# Patient Record
(demographics unavailable — no encounter records)

---

## 2017-05-07 NOTE — EDPHY
H & P


Stated Complaint: took adderal and smoked marijuana and felt CP 4/10


Time Seen by Provider: 05/07/17 01:39


HPI/ROS: 





Chief Complaint:  Chest tightness, feels unwell





HPI:  19-year-old male with a history of attention deficit hyperactivity 

disorder who took 3 of his Vyvanse earlier this evening after smoking some 

marijuana.  Patient became increasingly anxious and started developing some 

substernal chest pressure to a 4/10.  He has a history of hypercholesterolemia 

so was concerned that this might represent a heart attack.  Pain is not 

exertional.  Began at rest.  On arrival EMS found him to be tachycardic.  Once 

again his heart rate down his chest tightness went away.  Denies any fevers or 

chills. No cough.  No nausea or vomiting. Patient states that he is anxious 

about his examinations and took an extra Vyvanse this morning to help him 

study.  Now is currently feeling improved on arrival to the emergency 

department.





ROS:  10 point Review of Systems is negative except as noted in the HPI.





PMH:  Attention deficit hyperactivity disorder


Medications:  Vyvanse





Social History: No smoking, no alcohol, occasional marijuana use





Family History: non-contributory





Physical Exam:


Gen: Awake, Alert, anxious appearing


HEENT:  


     Nose: no rhinorrhea


     Eyes: PERRLA, EOMI


     Mouth: Moist mucosa 


Neck: Supple, no JVD


Chest: nontender, lungs clear to auscultation


Heart: S1, S2 normal, no murmur


Abd: Soft, non-tender, no guarding


Back: no CVA tenderness, no midline tenderness 


Ext: no edema, non-tender


Skin: no rash


Neuro: CN II-XII intact, Sensation grossly intact, Strength 5/5 in bilateral 

upper and lower extremities








- Personal History


Current Tetanus/Diphtheria Vaccine: Unsure


Current Tetanus Diphtheria and Acellular Pertussis (TDAP): Unsure





- Medical/Surgical History


Hx Asthma: No


Hx Chronic Respiratory Disease: No


Hx Diabetes: No


Hx Cardiac Disease: No


Hx Renal Disease: No


Hx Cirrhosis: No


Hx Alcoholism: No


Hx HIV/AIDS: No


Hx Splenectomy or Spleen Trauma: No


Other PMH: anxiety, ADHD, seasonal depression





- Social History


Smoking Status: Never smoked


Constitutional: 


 Initial Vital Signs











Temperature (C)  36.7 C   05/07/17 01:47


 


Heart Rate  88   05/07/17 01:47


 


Respiratory Rate  16   05/07/17 01:47


 


Blood Pressure  153/92 H  05/07/17 01:47


 


O2 Sat (%)  96   05/07/17 01:47








 











O2 Delivery Mode               Room Air














Allergies/Adverse Reactions: 


 





No Known Allergies Allergy (Unverified 05/07/17 01:49)


 








Home Medications: 














 Medication  Instructions  Recorded


 


Adderall 10 MG (*)  05/07/17














Medical Decision Making





- Diagnostics


EKG Interpretation: 





ECG time 1:45 a.m. sinus rhythm with a rate of 87, normal axis, normal intervals

, no acute ST or T-wave changes.  Impression normal ECG


ED Course/Re-evaluation: 





19-year-old male who had some chest discomfort after taking too many of his 

Vyvanse to help him to study.  This occurred after he was taking using some 

marijuana.  ECG is normal shows no acute ischemia.  He has no other risk 

factors.  No other stimulant use.  Heart rate is down.  He is resting 

comfortably.  Will discharge with follow-up with primary care.  I have advised 

him that he should not be using marijuana he is taking Vyvanse.  He should also 

make sure to take the appropriate dosing.





Departure





- Departure


Disposition: Home, Routine, Self-Care


Clinical Impression: 


 Stimulant abuse, Anxiety, Cannabis abuse





Condition: Good


Instructions:  ADHD in Adults (ED), Cannabis Abuse  (ED), Anxiety (ED)


Additional Instructions: 


Make sure to take the appropriate dosing of your attention deficit 

hyperactivity disorder medications at the appropriate times.


Avoid using cannabis or other depressants if you're taking attention deficit 

hyperactivity disorder medications.


Follow up with primary care doctor in 3-4 days for re-evaluation.


Return to the emergency depart for increasing chest pain, shortness of breath, 

fevers, chills, or any other concerns.


Referrals: 


Patient,NotPresent [Primary Care Provider] - As per Instructions


[facility] [Clinic] - As per Instructions

## 2017-05-07 NOTE — CPEKG
Heart Rate: 87

RR Interval: 690

P-R Interval: 128

QRSD Interval: 90

QT Interval: 344

QTC Interval: 414

P Axis: 72

QRS Axis: 48

T Wave Axis: 47

EKG Severity - ABNORMAL ECG -

EKG Impression: SINUS RHYTHM

EKG Impression: Nonspecific r wave progression

Electronically Signed By: Lyndsey Burk 07-May-2017 03:18:26